# Patient Record
Sex: MALE | Race: BLACK OR AFRICAN AMERICAN | NOT HISPANIC OR LATINO | Employment: STUDENT | URBAN - METROPOLITAN AREA
[De-identification: names, ages, dates, MRNs, and addresses within clinical notes are randomized per-mention and may not be internally consistent; named-entity substitution may affect disease eponyms.]

---

## 2019-09-08 ENCOUNTER — HOSPITAL ENCOUNTER (EMERGENCY)
Facility: HOSPITAL | Age: 19
Discharge: HOME | End: 2019-09-08
Attending: EMERGENCY MEDICINE | Admitting: EMERGENCY MEDICINE

## 2019-09-08 ENCOUNTER — APPOINTMENT (OUTPATIENT)
Dept: GENERAL RADIOLOGY | Facility: HOSPITAL | Age: 19
End: 2019-09-08

## 2019-09-08 VITALS
HEART RATE: 115 BPM | RESPIRATION RATE: 16 BRPM | OXYGEN SATURATION: 95 % | BODY MASS INDEX: 28.12 KG/M2 | TEMPERATURE: 99.1 F | WEIGHT: 175 LBS | SYSTOLIC BLOOD PRESSURE: 118 MMHG | HEIGHT: 66 IN | DIASTOLIC BLOOD PRESSURE: 61 MMHG

## 2019-09-08 DIAGNOSIS — J45.901 EXACERBATION OF ASTHMA, UNSPECIFIED ASTHMA SEVERITY, UNSPECIFIED WHETHER PERSISTENT: Primary | ICD-10-CM

## 2019-09-08 LAB
HOLD SPECIMEN: NORMAL
HOLD SPECIMEN: NORMAL
WHOLE BLOOD HOLD SPECIMEN: NORMAL
WHOLE BLOOD HOLD SPECIMEN: NORMAL

## 2019-09-08 PROCEDURE — 96374 THER/PROPH/DIAG INJ IV PUSH: CPT

## 2019-09-08 PROCEDURE — 94799 UNLISTED PULMONARY SVC/PX: CPT

## 2019-09-08 PROCEDURE — 25010000002 METHYLPREDNISOLONE PER 125 MG: Performed by: NURSE PRACTITIONER

## 2019-09-08 PROCEDURE — 94640 AIRWAY INHALATION TREATMENT: CPT

## 2019-09-08 PROCEDURE — 71045 X-RAY EXAM CHEST 1 VIEW: CPT

## 2019-09-08 PROCEDURE — 99284 EMERGENCY DEPT VISIT MOD MDM: CPT

## 2019-09-08 PROCEDURE — 93005 ELECTROCARDIOGRAM TRACING: CPT

## 2019-09-08 PROCEDURE — 93005 ELECTROCARDIOGRAM TRACING: CPT | Performed by: EMERGENCY MEDICINE

## 2019-09-08 RX ORDER — IPRATROPIUM BROMIDE AND ALBUTEROL SULFATE 2.5; .5 MG/3ML; MG/3ML
3 SOLUTION RESPIRATORY (INHALATION) ONCE
Status: COMPLETED | OUTPATIENT
Start: 2019-09-08 | End: 2019-09-08

## 2019-09-08 RX ORDER — ALBUTEROL SULFATE 2.5 MG/3ML
2.5 SOLUTION RESPIRATORY (INHALATION) EVERY 4 HOURS PRN
Qty: 90 ML | Refills: 0 | Status: SHIPPED | OUTPATIENT
Start: 2019-09-08

## 2019-09-08 RX ORDER — PREDNISONE 20 MG/1
60 TABLET ORAL DAILY
Qty: 15 TABLET | Refills: 0 | Status: SHIPPED | OUTPATIENT
Start: 2019-09-08

## 2019-09-08 RX ORDER — METHYLPREDNISOLONE SODIUM SUCCINATE 125 MG/2ML
125 INJECTION, POWDER, LYOPHILIZED, FOR SOLUTION INTRAMUSCULAR; INTRAVENOUS ONCE
Status: COMPLETED | OUTPATIENT
Start: 2019-09-08 | End: 2019-09-08

## 2019-09-08 RX ORDER — SODIUM CHLORIDE 0.9 % (FLUSH) 0.9 %
10 SYRINGE (ML) INJECTION AS NEEDED
Status: DISCONTINUED | OUTPATIENT
Start: 2019-09-08 | End: 2019-09-08 | Stop reason: HOSPADM

## 2019-09-08 RX ADMIN — METHYLPREDNISOLONE SODIUM SUCCINATE 125 MG: 125 INJECTION, POWDER, FOR SOLUTION INTRAMUSCULAR; INTRAVENOUS at 02:24

## 2019-09-08 RX ADMIN — IPRATROPIUM BROMIDE AND ALBUTEROL SULFATE 3 ML: 2.5; .5 SOLUTION RESPIRATORY (INHALATION) at 02:27

## 2019-09-08 RX ADMIN — IPRATROPIUM BROMIDE AND ALBUTEROL SULFATE 3 ML: 2.5; .5 SOLUTION RESPIRATORY (INHALATION) at 03:15

## 2019-09-08 NOTE — ED PROVIDER NOTES
Subjective   Roberto Carlos Mullins is a 18 yr old male that presents to the ER for complaints of difficulty breathing.  Patient has a history of asthma.  Patient reports increase in wheezing, coughing.  Patient complains of chest tightness, difficulty breathing.  Patient has tried his inhaler with minimal relief.  He denies any fevers, chills.        History provided by:  Patient  Asthma   Severity:  Moderate  Timing:  Constant  Progression:  Worsening  Context: activity    Worsened by:  Activity, coughing and exertion  Associated symptoms: cough, vomiting and wheezing    Associated symptoms: no fever        Review of Systems   Constitutional: Negative for chills and fever.   Respiratory: Positive for cough, shortness of breath and wheezing.    Gastrointestinal: Positive for nausea and vomiting.   All other systems reviewed and are negative.      Past Medical History:   Diagnosis Date   • Asthma        No Known Allergies    Past Surgical History:   Procedure Laterality Date   • KNEE SURGERY Right        History reviewed. No pertinent family history.    Social History     Socioeconomic History   • Marital status: Single     Spouse name: Not on file   • Number of children: Not on file   • Years of education: Not on file   • Highest education level: Not on file   Tobacco Use   • Smoking status: Never Smoker   • Smokeless tobacco: Never Used   Substance and Sexual Activity   • Alcohol use: No     Frequency: Never           Objective   Physical Exam   Constitutional: He is oriented to person, place, and time. He appears well-developed and well-nourished. He is cooperative.  Non-toxic appearance.   Pt is dyspneic.  Pt has audible wheezes.   HENT:   Head: Normocephalic and atraumatic.   Eyes: Conjunctivae, EOM and lids are normal.   Neck: Trachea normal, normal range of motion and full passive range of motion without pain.   Cardiovascular: Regular rhythm, normal heart sounds, intact distal pulses and normal pulses. Tachycardia  present.   Pulmonary/Chest: Effort normal. No respiratory distress. He has no decreased breath sounds. He has wheezes. He has no rhonchi. He has no rales.   Abdominal: Soft. Normal appearance and bowel sounds are normal. There is no tenderness.   Musculoskeletal: Normal range of motion.   Neurological: He is alert and oriented to person, place, and time. He has normal strength. No cranial nerve deficit.   Skin: Skin is warm, dry and intact. No rash noted.   Psychiatric: He has a normal mood and affect. His speech is normal and behavior is normal.   Nursing note and vitals reviewed.      Procedures           ED Course  ED Course as of Sep 15 0358   Sun Sep 08, 2019   0259 Pt is feeling better after breathing treatment. Pt continues to have wheezing.  Additional treatment ordered.    [KG]   0423 Patient is feeling much better at this time.  Patient will be discharged home.  Patient to take meds as ordered.  Patient to follow-up with his PCP.  Patient agrees and verbalized understanding.  [KG]      ED Course User Index  [KG] Nahed Rod, APRN        No results found for this or any previous visit (from the past 24 hour(s)).  Note: In addition to lab results from this visit, the labs listed above may include labs taken at another facility or during a different encounter within the last 24 hours. Please correlate lab times with ED admission and discharge times for further clarification of the services performed during this visit.    XR Chest 1 View   Final Result   No acute cardiopulmonary findings.      Signer Name: Reji Santos MD    Signed: 9/8/2019 2:51 AM    Workstation Name: [id number]     [facility]        Vitals:    09/08/19 0329 09/08/19 0330 09/08/19 0435 09/08/19 0451   BP:  129/85  118/61   BP Location:    Right arm   Patient Position:    Sitting   Pulse: 109  112 115   Resp:    16   Temp:       TempSrc:       SpO2: 94%  95% 95%   Weight:       Height:         Medications    methylPREDNISolone sodium succinate (SOLU-Medrol) injection 125 mg (125 mg Intravenous Given 9/8/19 0224)   ipratropium-albuterol (DUO-NEB) nebulizer solution 3 mL (3 mL Nebulization Given 9/8/19 0227)   ipratropium-albuterol (DUO-NEB) nebulizer solution 3 mL (3 mL Nebulization Given 9/8/19 0315)     ECG/EMG Results (last 24 hours)     Procedure Component Value Units Date/Time    ECG 12 Lead [[id number]] Collected:  09/08/19 0016     Updated:  09/08/19 0013        ECG 12 Lead   Final Result   Test Reason : CHEST PAIN   Blood Pressure : **/** mmHG   Vent. Rate : 119 BPM     Atrial Rate : 119 BPM      P-R Int : 130 ms          QRS Dur : 070 ms       QT Int : 314 ms       P-R-T Axes : 076 088 009 degrees      QTc Int : 441 ms      Sinus tachycardia   T wave abnormality, consider inferior ischemia   Abnormal ECG   No previous ECGs available   Confirmed by MD Junito, Leonides ([id number] on 9/9/2019 5:52:28 AM      Referred By:  EDMD           Confirmed By:Leonides Wild MD                    [facility]    Final diagnoses:   Exacerbation of asthma, unspecified asthma severity, unspecified whether persistent              Nahed Rod, APRN  09/15/19 0356